# Patient Record
Sex: FEMALE | Race: WHITE | URBAN - METROPOLITAN AREA
[De-identification: names, ages, dates, MRNs, and addresses within clinical notes are randomized per-mention and may not be internally consistent; named-entity substitution may affect disease eponyms.]

---

## 2017-06-23 ENCOUNTER — OFFICE VISIT (OUTPATIENT)
Dept: INTERNAL MEDICINE CLINIC | Facility: CLINIC | Age: 32
End: 2017-06-23

## 2017-06-23 VITALS
BODY MASS INDEX: 22 KG/M2 | TEMPERATURE: 98 F | HEART RATE: 66 BPM | SYSTOLIC BLOOD PRESSURE: 102 MMHG | OXYGEN SATURATION: 98 % | WEIGHT: 123 LBS | DIASTOLIC BLOOD PRESSURE: 65 MMHG

## 2017-06-23 DIAGNOSIS — R21 RASH: Primary | ICD-10-CM

## 2017-06-23 DIAGNOSIS — T78.40XA ALLERGIC REACTION, INITIAL ENCOUNTER: ICD-10-CM

## 2017-06-23 PROCEDURE — 99212 OFFICE O/P EST SF 10 MIN: CPT | Performed by: INTERNAL MEDICINE

## 2017-06-23 PROCEDURE — 99213 OFFICE O/P EST LOW 20 MIN: CPT | Performed by: INTERNAL MEDICINE

## 2017-06-23 RX ORDER — AZITHROMYCIN 500 MG/1
TABLET, FILM COATED ORAL
Refills: 1 | COMMUNITY
Start: 2017-06-12

## 2017-06-23 NOTE — PATIENT INSTRUCTIONS
ASSESSMENT/PLAN:   Rash  (primary encounter diagnosis)- will treat with steroid cream, use as prescribed  Let us know if not better   Allergic reaction, initial encounter- as above, if not improved might need po steroids

## 2017-06-23 NOTE — PROGRESS NOTES
HPI:    Patient ID: Kandy Lan is a 27year old female. HPI  Pt comes in today with complaint of a rash to front front of chest and back. Pt had changed her fabric softener and the symptoms started.  In the past had used a hypoallergic one and was work better   Allergic reaction, initial encounter- as above, if not improved might need po steroids     No orders of the defined types were placed in this encounter.        Meds This Visit:  Signed Prescriptions Disp Refills    triamcinolone acetonide 0.1 % Ext

## 2022-07-22 NOTE — TELEPHONE ENCOUNTER
Thank you Nancy. Please advise Ms. Alegre to come in to the laboratory for stool tests ordered today to further evaluate. She should get the labs ordered by Dr. Madhav Bell ordered at the same time. Apparently routine labs and CT scan were normal during outside ER evaluation. Much more important, she should be checked for gallstones. If she had a CT scan before, that does not  gallstones. Ultrasound of liver and gallbladder ordered today to check her for gallstones. Please advise her to call and schedule the ultrasound. Dr. Madhav Bell should be able to assess when she returns.     - cb

## 2022-07-28 NOTE — TELEPHONE ENCOUNTER
Reviewed labs, negative for infection or Hpylori   Stool calprotectin normal  NO celiac and normal thyroid  Still awaiting US abd

## 2022-08-02 NOTE — TELEPHONE ENCOUNTER
Conveyed below with Noemi Pittman. Would like to proceed with egd and colonoscopy. Only egd orders provided at time of visit. Routed symptom update and request for colonoscopy orders to Dr. Greta Durbin for further review. See telephone encounter dated 7/18/22.

## 2022-09-08 NOTE — TELEPHONE ENCOUNTER
pt is returning the sched call to resched 10/25/2022 CLN/EGD proc at [facility]. I tried to reach sched but no answer.

## 2022-10-17 NOTE — TELEPHONE ENCOUNTER
Received e-mail from [facility] asking to cancel in [employer]. I sent cancellation through [employer] and also removed from appt desk in [facility] [address].

## 2022-11-08 NOTE — TELEPHONE ENCOUNTER
Scheduled for:  Colonoscopy [phone] & EGD [id number]  Provider Name:  Dr. Kelley Ricks  Date:  01/17/2023  Location:  [facility]  Sedation:  MAC  Time:  1:30 pm, (pt is aware that [facility] will call the day before to confirm arrival time)  Prep:  Trilyte  Meds/Allergies Reconciled?:  Yes  Diagnosis with codes:  Bloating R14.0; Diarrhea R19.7; Abdominal pain R10.9  Was patient informed to call insurance with codes (Y/N):  Yes  Referral sent?:  Yes  [facility] or [address] notified?:  Electronic case request was sent to [facility] via [employer]. Medication Orders:  N/A  Misc Orders:  Patient was informed that they will need a COVID 19 test prior to their procedure. Patient verbally understood & will await a phone call from [facility] to schedule. Further instructions given by staff: Informed patient I will send updated prep instructions via [employer] and she verbalized understanding.

## 2023-01-18 NOTE — TELEPHONE ENCOUNTER
I believe she is breastfeeding too so please have her confirm what to do with medications while breastfeeding with pharmacy and if they are safe

## 2023-01-18 NOTE — TELEPHONE ENCOUNTER
Called patient,  verified. Patient confirmed that she is breastfeeding. Below message relayed, instructed to call if pharmacy does not recommend medications. Patient verbalized understanding.

## 2023-01-19 NOTE — TELEPHONE ENCOUNTER
----- Message from Vladimir Ramires MD sent at 1/18/2023  3:05 PM CST -----  Recall colonoscopy age appropriate screening 38  Call patient and review Hpylori treatment and stool test in 8 weeks  Celiac blood work ordered

## 2023-01-20 NOTE — TELEPHONE ENCOUNTER
[address] [phone] to find out if it is safe for patient to take medications sent for H. Pylori treatment while patient is breastfeeding, spoke with Kenan Carter and transferred me to Carl Lopez, pharmacist. Carl Lopez said he will review and will call me back. CSS: If Carl Lopez, pharmacist from [place] calls, please transfer the call to me. Thank you. Health maintenance updated. Last colonoscopy done 1/17/23 by Dr. Roseanna Guerrero    7 year recall placed into Pt Outreach (age 38)    Next due on 11/9/2030  per Dr. Roseanna Guerrero. (age 38)    EGD placed in specialty comments.

## 2023-01-20 NOTE — TELEPHONE ENCOUNTER
Dr. Greta Durbin,    Please advise below. I don't see the orders for celiac blood work. Thank you. Called Bin [phone],TICO with Jamison Carroll, pharmacist. He said that \"Metronidazole, Tetracycline, Omeprazole and Bismuth are not contraindicated with breastfeeding however precaution is required\". [facility] pharmacy, spoke with pharmacist Jennifer to inquire about above medication precaution. She stated that Tetracycline and Metronidazole can pass into breast milk so patient should pump and dump the milk while taking these medications and 24 hours after the last treatment. Called patient, SARWAT verified. Patient confirmed that she is breastfeeding, stated that she has not picked up the medications yet. Informed above precautions and told patient that MD will be notified for possible alternate therapy and to not  medications until she hears from us. Patient verbalized understanding.

## 2023-02-06 NOTE — TELEPHONE ENCOUNTER
Dr. Dylan Severino,    Please advise on triple therapy. Amoxicillin and Clindamycin are OK. Thank you.     Called pharmacy to inquire regarding breastfeeding precautions with the antibiotics, spoke with Lyn Sherman, pharmacist.    Ronnell Estevez with breastfeeding    Clindamycin- ok with breastfeeding

## 2023-02-06 NOTE — TELEPHONE ENCOUNTER
Can wait to treat if feeling well until after done breastfeeding   Can also offer triple therapy with clindamycin, amoxicillin, PPI   Amoxicillin does not pass and clindamycin in small amounts so would check with pharmacy  Celiac labs ordered

## 2023-02-06 NOTE — TELEPHONE ENCOUNTER
Note  ----- Message from Stella Panchal MD sent at 1/18/2023  3:05 PM CST -----  Recall colonoscopy age appropriate screening 38  Call patient and review Hpylori treatment and stool test in 8 weeks  Celiac blood work ordered  [facility]

## 2023-04-14 NOTE — TELEPHONE ENCOUNTER
Patient outreach message received:    Patient was made aware and was informed that we will call her for reminder for repeat h. Pylori test post treatment.

## 2023-04-20 NOTE — TELEPHONE ENCOUNTER
Called pt-left detailed message to go to [facility] lab to  stool kit for repeat h. Pylori stool test.    Call back number provided for pt to call back with further questions/concerns.

## 2023-05-08 NOTE — TELEPHONE ENCOUNTER
Called patient, name/ verified. Informed pt of the repeat stool test for h. Pylori to be done and instructed to go to any [facility] lab. Pt stated she is not taking any PPI's. Patient verbalized understanding, \" Purvi  will go\", no further concerns, issues at this time.     No further action required        TE closed

## 2023-07-13 NOTE — TELEPHONE ENCOUNTER
From: Nadira Mccray  To: Connie Jacobs MD  Sent: 7/10/2023 2:06 PM CDT  Subject: Question regarding H.  PYLORI STOOL ANTIGEN    I ended up at the hospital on Sunday with unbearable stomach pain amd it turns out that i still have h pylori

## 2023-07-13 NOTE — TELEPHONE ENCOUNTER
Poonam Monaco  Clinical Staff (supporting Leonard Hi MD)1 hour ago (2:04 PM)     Yes I am being retreated, and I was tested a week later so how was I infected again

## 2023-07-20 NOTE — TELEPHONE ENCOUNTER
H. Pylori testing recall placed into Pt Outreach to be completed 8 weeks after completing 14 day antibiotic regimen due around 9/4/23.

## 2023-08-02 NOTE — TELEPHONE ENCOUNTER
RN called and spoke to pt regarding her [employer] message. Pt states she is not having any pain currently, c/o one episode of abdominal pain after finishing her most recent round of treatment for h pylori. Pain is umbilical and can last all day, not initiated by anything and not relieved by anything-goes away on it's own. Pt denies N/V/C/D/fevers/bleeding. Pain was similar to pain she had initially seen Dr. Caridad Dumas for in 2022. Pt instructed to notify the office for any new symptoms otherwise plan is to proceed with h pylori eradication testing when due (early Sept). Pt agreeable and verbalized understanding.        Curt GALEANO [facility] Clinical Staff (supporting Maia Arauz MD)         7/28/23 10:04 AM  Good morning, i finished my antibiotics last weeka dn i am getting stomach pains again is this normal?

## 2023-09-08 NOTE — TELEPHONE ENCOUNTER
RN called and spoke to pt, informed her she is due for h pylori eradication testing. H pylori breath test is already ordered. Reiterated that testing should be done at least 6 weeks after 14 day course of treatment is completed and that pt should not take any abx, ppi, or pepto bismol for two weeks before breath test. Pt confirmed that she is not taking those medications and verbalized understanding.

## 2023-09-08 NOTE — TELEPHONE ENCOUNTER
Patient outreach message received:    H. Pylori testing recall placed into Pt Outreach to be completed 8 weeks after completing 14 day antibiotic regimen due around 9/4/23.

## 2024-05-22 NOTE — TELEPHONE ENCOUNTER
Patient scheduled an appointment via [facility] for the following concern:    Lower abdominal pains, hard to loose weight and severe headaches

## 2024-05-23 NOTE — TELEPHONE ENCOUNTER
Attempted to call pt but mailbox is not set up yet.   [employer] message sent to patient to call back.       Future Appointments   Date Time Provider Department Center   7/5/2024  9:40 AM Saloni Montero APRN [facility]

## 2024-05-24 NOTE — TELEPHONE ENCOUNTER
Patient has read [employer] message.     From  Chelita Cheney RN To  Britney Alegre Sent and Delivered  5/23/2024 10:25 AM   Last Read in [employer]  5/23/2024  8:25 PM by Britney Alegre     Attempted to call patient again, no answer, voicemail box is still not set up.

## 2024-07-05 NOTE — PROGRESS NOTES
HPI    Patient presents for concerns of abdominal bloating as well as abdominal pain.  With a history of H. pylori.  Would like to recheck to make sure this is not the cause of her symptoms.  Also with concerns of a recurrent hemorrhoid.  Has been using over-the-counter creams without relief of symptoms.  With scant amount of blood when wiping.  Denies constipation.    Also with concerns of recurrent neck pain.  Has been going to a chiropractor which she reports has helped at times but symptoms are still recurring.  Denies radiation of pain into bilateral arms.  Takes Tylenol as needed with resolution of pain.  Pain started 2 years ago after falling off of a stepstool.    Patient also complains of very heavy menstrual cycles.  Did not have this problem prior to having her son.    Also with concerns of issues with losing weight.  Has been on a strict diet and exercise regimen for the past 2 months.  States that she has only been able to lose 8 pounds while family members so she has been on the same diet with have been able to lose 20.  Would like to check hormone levels as well as thyroid function and insulin level.    Review of Systems   Constitutional:  Positive for unexpected weight change.   Gastrointestinal:  Positive for abdominal distention and abdominal pain.   Genitourinary:  Positive for menstrual problem.   Musculoskeletal:  Positive for neck pain.        Vitals:    07/05/24 0946   BP: 101/69   Pulse: 74   Weight: 180 lb (81.6 kg)   Height: 5' 2\" (1.575 m)     Body mass index is 32.92 kg/m².    Health Maintenance   Topic Date Due    Annual Depression Screening  01/01/2024    Annual Physical  06/28/2024    Influenza Vaccine (1) 10/01/2024    Pap Smear  06/28/2026    DTaP,Tdap,and Td Vaccines (2 - Td or Tdap) 06/09/2028    Colorectal Cancer Screening  11/09/2030    Pneumococcal Vaccine: Birth to 64yrs  Aged Out       No LMP recorded.    Past Medical History:    Hemorrhoids       .  Past Surgical History:    Procedure Laterality Date    Hernia repair  2014    umbilical hernia repair    Hernia surgery         Family History   Problem Relation Age of Onset    Heart Attack Father     Heart Attack Other        Social History     Socioeconomic History    Marital status:      Spouse name: Not on file    Number of children: 0    Years of education: Not on file    Highest education level: Not on file   Occupational History    Not on file   Tobacco Use    Smoking status: Former    Smokeless tobacco: Never   Vaping Use    Vaping status: Never Used   Substance and Sexual Activity    Alcohol use: Not Currently     Comment: occasionally    Drug use: Never    Sexual activity: Not on file   Other Topics Concern     Service No    Blood Transfusions No    Caffeine Concern Yes     Comment: coffee    Occupational Exposure No    Hobby Hazards No    Sleep Concern No    Stress Concern Yes    Weight Concern No    Special Diet No    Back Care No    Exercise Yes    Bike Helmet No    Seat Belt Yes    Self-Exams Yes   Social History Narrative    ** Merged History Encounter **          Social Determinants of Health     Financial Resource Strain: Not on file   Food Insecurity: No Food Insecurity (3/29/2021)    [facility]    Hunger Vital Sign     Worried About Running Out of Food in the Last Year: Never true     Ran Out of Food in the Last Year: Never true   Transportation Needs: Not on file   Physical Activity: Not on file   Stress: Not on file   Social Connections: Not on file   Housing Stability: Not on file       Current Outpatient Medications   Medication Sig Dispense Refill    hydrocortisone 2.5 % External Cream Place 1 Application rectally 2 (two) times daily as needed for Hemorrhoids. 28 g 2    Multiple Vitamin (ONE-DAILY MULTI VITAMINS) Oral Tab Take 1 tablet by mouth daily.      Omega-3 1000 MG Oral Cap Take 1 capsule by mouth daily.      Turmeric 500 MG Oral  Cap Take 1 capsule by mouth daily.         Allergies:  No Known Allergies    Physical Exam  Vitals and nursing note reviewed.   Constitutional:       General: She is not in acute distress.     Appearance: Normal appearance.   HENT:      Head: Normocephalic and atraumatic.   Cardiovascular:      Rate and Rhythm: Normal rate and regular rhythm.      Heart sounds: Normal heart sounds. No murmur heard.  Pulmonary:      Effort: Pulmonary effort is normal. No respiratory distress.      Breath sounds: Normal breath sounds. No stridor. No wheezing, rhonchi or rales.   Chest:      Chest wall: No tenderness.   Abdominal:      General: Abdomen is flat. Bowel sounds are normal. There is no distension.      Palpations: Abdomen is soft. There is no mass.      Tenderness: There is no abdominal tenderness. There is no guarding or rebound.      Hernia: No hernia is present.   Skin:     General: Skin is warm and dry.   Neurological:      Mental Status: She is alert and oriented to person, place, and time.          Assessment and Plan:  Problem List Items Addressed This Visit    None  Visit Diagnoses       Abdominal bloating    -  Primary    Relevant Medications    hydrocortisone 2.5 % External Cream    Other Relevant Orders    Helicobacter Pylori Breath Test, Adult    Abdominal pain, unspecified abdominal location        Relevant Medications    hydrocortisone 2.5 % External Cream    Other Relevant Orders    Helicobacter Pylori Breath Test, Adult    Neck pain        Relevant Orders    PHYSICAL THERAPY - INTERNAL    Menorrhagia with regular cycle        Relevant Orders    OBG Referral - In Network    XR CERVICAL SPINE (4VIEWS) (CPT=72050)    Cervical spine pain        Class 1 obesity with body mass index (BMI) of 32.0 to 32.9 in adult, unspecified obesity type, unspecified whether serious comorbidity present        Hemorrhoids, unspecified hemorrhoid type        Relevant Medications    hydrocortisone 2.5 % External Cream            Cervical spine x-ray, physical therapy assessment and treatment ordered for patient.  Referral to GYN for assessment and treatment.  Anusol cream twice daily as needed.  H. pylori breath test.  Lab workup ordered for patient, as well.     Discussed plan of care with patient and patient is in agreement.  All questions answered. Patient to call with questions or concerns.    Encouraged to sign up for My Chart if not already registered.

## 2024-08-28 NOTE — PROGRESS NOTES
[facility]  Obstetrics and Gynecology  Gyne Problem Visit      Britney Alegre is a 38 year old female  to discuss heavy periods. States since her last pregnancy 2 years ago periods have gotten heavier. Cycles are regular with menses lasting about 5 days. First 2 days are heaviest, states she will soak through overnight pads, will change pads/tampons every 2 hours during the day. Does not notice large clots. Patient feeling more fatigued and headaches with occasional dizziness. Has not had a recent CBC or iron study completed. Denies history of anemia. Patient states she is done with having children. She is not on any form of contraception. No abnormal vaginal discharge, odor, irritation, or itching. No intermenstrual spotting.    Patient's last menstrual period was 2024.     Pap: 2023  Contraception:None    OBSTETRICS HISTORY:  OB History    Para Term  AB Living   2 1 1 0 0 1   SAB IAB Ectopic Multiple Live Births   0 0 0 0 1       GYNE HISTORY:      Period Cycle (Days): 28-30 (2024  9:51 AM)  Period Duration (Days): 5 (2024  9:51 AM)  Period Flow: heavy (2024  9:51 AM)  Use of Birth Control (if yes, specify type): None (2024  9:51 AM)        Latest Ref Rng & Units 2023     1:13 PM   RECENT PAP RESULTS   Thinprep Pap Negative for intraepithelial lesion or malignancy Negative for intraepithelial lesion or malignancy    HPV Negative Negative          History   Sexual Activity    Sexual activity: Not on file       MEDICAL HISTORY:  Past Medical History:   Diagnosis Date    Hemorrhoids      Past Surgical History:   Procedure Laterality Date    Hernia repair  2014    umbilical hernia repair    Hernia surgery         SOCIAL HISTORY:  Social History     Socioeconomic History    Marital status:      Spouse name: Not on file    Number of children: 0    Years of education: Not on file    Highest education level: Not on file   Occupational History    Not on file   Tobacco  Use    Smoking status: Former    Smokeless tobacco: Never   Vaping Use    Vaping status: Never Used   Substance and Sexual Activity    Alcohol use: Not Currently     Comment: occasionally    Drug use: Never    Sexual activity: Not on file   Other Topics Concern     Service No    Blood Transfusions No    Caffeine Concern Yes     Comment: coffee    Occupational Exposure No    Hobby Hazards No    Sleep Concern No    Stress Concern Yes    Weight Concern No    Special Diet No    Back Care No    Exercise Yes    Bike Helmet No    Seat Belt Yes    Self-Exams Yes   Social History Narrative    ** Merged History Encounter **          Social Determinants of Health     Financial Resource Strain: Not on file   Food Insecurity: No Food Insecurity (3/29/2021)    [facility]    Hunger Vital Sign     Worried About Running Out of Food in the Last Year: Never true     Ran Out of Food in the Last Year: Never true   Transportation Needs: Not on file   Physical Activity: Not on file   Stress: Not on file   Social Connections: Not on file   Housing Stability: Not on file       MEDICATIONS:    Current Outpatient Medications:     acidophilus-pectin Oral Cap, Take 1 capsule by mouth daily., Disp: , Rfl:     Multiple Vitamin (ONE-DAILY MULTI VITAMINS) Oral Tab, Take 1 tablet by mouth daily., Disp: , Rfl:     Turmeric 500 MG Oral Cap, Take 1 capsule by mouth daily., Disp: , Rfl:     hydrocortisone 2.5 % External Cream, Place 1 Application rectally 2 (two) times daily as needed for Hemorrhoids. (Patient not taking: Reported on 8/28/2024), Disp: 28 g, Rfl: 2    ALLERGIES:  No Known Allergies      REVIEW OF SYSTEMS:  Review of Systems   Constitutional:  Negative for chills, fever and unexpected weight change.   Respiratory: Negative.     Cardiovascular: Negative.    Gastrointestinal:  Negative for abdominal pain, constipation, diarrhea and nausea.   Genitourinary:  Positive for  menstrual problem (heavy bleeding). Negative for dyspareunia, dysuria, genital sores, hematuria, pelvic pain, vaginal bleeding, vaginal discharge and vaginal pain.   Musculoskeletal: Negative.    Skin: Negative.    Neurological: Negative.    Hematological: Negative.    Psychiatric/Behavioral: Negative.         PHYSICAL EXAM:  /75 (BP Location: Right arm, Patient Position: Sitting, Cuff Size: adult)   Pulse 69   Wt 182 lb (82.6 kg)   LMP 08/12/2024   BMI 33.29 kg/m²     GENERAL: well developed, well nourished, in no apparent distress  ABDOMEN: Soft, non distended; non tender, no masses  GYNE/: External Genitalia: Normal appearing, no lesions. Urethral meatus appear wnl, no abnormal discharge or lesions noted.          Bladder: well supported, urethra wnl, no lesions or fissures                     Vagina: normal pink mucosa, no lesions, normal clear discharge.                      Uterus:  mobile, non tender, normal size                     Cervix: Normal                      Adnexa: non tender, no masses, normal size     ASSESSMENT:       ICD-10-CM    1. Menorrhagia with regular cycle  N92.0 CBC With Differential With Platelet     Iron and TIBC     Ferritin     US PELVIS (TRANSABDOMINAL AND TRANSVAGINAL) (CPT=76856/74488)      2. Fatigue, unspecified type  R53.83 CBC With Differential With Platelet     Iron and TIBC     Ferritin      3. Dizziness  R42 CBC With Differential With Platelet     Iron and TIBC     Ferritin          Plan:  Pt counseled on possible etiologies of heavy periods and/or irregular bleeding including uterine fibroids, DUB/anovulatory bleeding and other benign and less common malignant etiologies.  Discussed possible work-up options including exam, pelvic US and endometrial biopsy.  Discussed treatment options including medical and surgical.  Medical options include OCPs, Nuvaring, patch for cycle control along with depo provera for menstrual suppression.  Discussed Mirena IUD and  menstrual benefits.  Discussed surgical options that include endometrial ablation and hysterectomy.  Pt counseled on risks/benefits of each of the appropriate options.  We will first order labs and ultrasound, patient is considering IUD vs. Surgical management. Will await results.       SAMANTHA FERGUSON PA-C  9:42 AM  8/28/2024

## 2024-10-22 NOTE — TELEPHONE ENCOUNTER
" Writer called  [phone] and spoke with  Sary, Behavioral Health . Serum Potassium findings of 3.5 was reported to Sary. \" Freddy Tolbert paper work is still under review. We will call to let you know if he is accepted\". Per Sary. Dr. Armas updated.    " Repeat H. Pylori test post treatment placed in pt outreach. Called patient,  verified. Patient was made aware and was informed that we will call her for reminder for repeat h. Pylori test post treatment. Patient verbalized understanding.

## 2024-11-02 NOTE — DISCHARGE INSTRUCTIONS
Salt water gargles and tea with honey may help soothe throat.  Tylenol or ibuprofen as needed for pain or fever.  I also do recommend taking Mucinex or Sudafed, sinus rinse daily, Flonase daily.  Close follow-up with your primary care provider as recommended.  Any worsening symptoms please go to the ER.

## 2024-11-02 NOTE — ED PROVIDER NOTES
Patient Seen in: [facility]      History     Chief Complaint   Patient presents with    Sore Throat    Sinus Problem     Stated Complaint: Cold- sinus pressure  Subjective:   HPI    This is a well-appearing 38-year-old with no significant past medical history who presents with a chief complaint of sore throat, frontal headache, sinus pressure over the last 3 days.  States she was recently exposed to strep.  Denies any posterior neck pain or neck stiffness.  No rash.  Continue have a good appetite.  No nausea, vomiting or diarrhea.  No cough.    Objective:   Past Medical History:    Hemorrhoids            Past Surgical History:   Procedure Laterality Date    Hernia repair  2014    umbilical hernia repair    Hernia surgery                Social History     Socioeconomic History    Marital status:     Number of children: 0   Tobacco Use    Smoking status: Former    Smokeless tobacco: Never   Vaping Use    Vaping status: Never Used   Substance and Sexual Activity    Alcohol use: Not Currently     Comment: occasionally    Drug use: Never   Other Topics Concern     Service No    Blood Transfusions No    Caffeine Concern Yes     Comment: coffee    Occupational Exposure No    Hobby Hazards No    Sleep Concern No    Stress Concern Yes    Weight Concern No    Special Diet No    Back Care No    Exercise Yes    Bike Helmet No    Seat Belt Yes    Self-Exams Yes   Social History Narrative    ** Merged History Encounter **          Social Drivers of Health     Food Insecurity: No Food Insecurity (3/29/2021)    [facility]    Hunger Vital Sign     Worried About Running Out of Food in the Last Year: Never true     Ran Out of Food in the Last Year: Never true            Review of Systems   All other systems reviewed and are negative.      Positive for stated complaint: Sore Throat and Sinus Problem    Other systems are as noted in  HPI.  Constitutional and vital signs reviewed.      All other systems reviewed and negative except as noted above.    Physical Exam     ED Triage Vitals [11/02/24 0942]   /75   Pulse 76   Resp 18   Temp 98.2 °F (36.8 °C)   Temp src Temporal   SpO2 98 %   O2 Device None (Room air)     Current:/75   Pulse 76   Temp 98.2 °F (36.8 °C) (Temporal)   Resp 18   LMP 08/12/2024   SpO2 98%     Physical Exam  Vitals and nursing note reviewed.   Constitutional:       General: She is awake. She is not in acute distress.     Appearance: Normal appearance. She is not ill-appearing, toxic-appearing or diaphoretic.   HENT:      Head: Normocephalic and atraumatic.      Right Ear: Tympanic membrane, ear canal and external ear normal. No tenderness. No middle ear effusion. Tympanic membrane is not erythematous.      Left Ear: Tympanic membrane, ear canal and external ear normal. No tenderness.  No middle ear effusion. Tympanic membrane is not erythematous.      Nose: Mucosal edema and rhinorrhea present. Rhinorrhea is clear.      Mouth/Throat:      Lips: Pink.      Mouth: Mucous membranes are moist. No oral lesions.      Pharynx: Oropharynx is clear. Uvula midline. Posterior oropharyngeal erythema present. No pharyngeal swelling, oropharyngeal exudate, uvula swelling or postnasal drip.   Eyes:      General: Lids are normal.      Extraocular Movements: Extraocular movements intact.      Conjunctiva/sclera: Conjunctivae normal.      Pupils: Pupils are equal, round, and reactive to light.   Cardiovascular:      Rate and Rhythm: Normal rate and regular rhythm.      Pulses: Normal pulses.      Heart sounds: Normal heart sounds.   Pulmonary:      Effort: Pulmonary effort is normal.      Breath sounds: Normal breath sounds and air entry. No stridor, decreased air movement or transmitted upper airway sounds.   Musculoskeletal:      Cervical back: Full passive range of motion without pain and normal range of motion.   Skin:      General: Skin is warm and dry.      Capillary Refill: Capillary refill takes less than 2 seconds.   Neurological:      General: No focal deficit present.      Mental Status: She is alert and oriented to person, place, and time.   Psychiatric:         Mood and Affect: Mood normal.         Behavior: Behavior normal. Behavior is cooperative.         Thought Content: Thought content normal.         Judgment: Judgment normal.       ED Course   Rapid strep and reevaluate  Strep negative.  Patient is requesting a culture.  No results found.  Labs Reviewed   POCT RAPID STREP - Normal   GRP A STREP CULT, THROAT       MDM     Medical Decision Making  Differential diagnoses reflecting the complexity of care include but are not limited to viral versus bacterial pharyngitis, viral sinusitis.    Comorbidities that add complexity to management include: N/A  History obtained by an independent source was from: N/A  Discussions of management was done with: N/A  My independent interpretations of studies include: Strep negative  Shared decision making was done by: Patient and myself  Patient is well appearing, non-toxic and in no acute distress.  Vital signs are stable.  Patient with clear speech, no drooling, easy respirations.  Discussed with the patient that the strep here was negative.  She is requesting a culture.  I did discuss with her this can be an expensive test but she would still like it completed.  I did discuss with her supportive care.  She did request antibiotics for a sinus infection.  I discussed with her her symptoms started 3 to 4 days ago, this is most likely viral in etiology.  I did encourage her to use sinus rinse daily, Flonase may take Sudafed and Mucinex as well.  Close follow-up with her PCP was recommended.  Patient verbalized plan of care and stated understanding.    Problems Addressed:  Sore throat: acute illness or injury        Disposition and Plan     Clinical Impression:  1. Acute viral pharyngitis     2. Sore throat         Disposition:  Discharge  11/2/2024 10:00 am    Follow-up:  Eula Mcknight MD  [address] [place] [id number]  [phone]                Medications Prescribed:  Current Discharge Medication List             Note to patient: The 21st Century cares act makes medical notes like these available to patients in the interest of transparency.  However, be advised this medical document and is intended as peer to peer communication.  It is read the medical language and may contain abbreviations or verbiage that are unfamiliar.  It may appear blunt or direct.  Medical documents are intended to carry relevant information, fax is evident and the clinical opinion of the practitioner.    This note was prepared using Dragon Medical voice recognition dictation software.  As a result, errors may occur.  When identified, these errors have been corrected.  While every attempt is made to correct errors during dictation, discrepancies may still exist.    Hawa Melara, APRN  11/2/2024  9:49 AM

## 2024-11-02 NOTE — ED INITIAL ASSESSMENT (HPI)
Close contact w/ people w/ positive strep, developed sore throat 3 days later this past Thursday. Also reports headache and sinus pressure.

## 2024-12-06 NOTE — PROGRESS NOTES
HPI    Patient presents for concerns of sinus congestion that has been present on and off for the past 4 months.  Has been using vicks nasal spray as needed with relief of symptoms.  With clear mucus.  Denies fever.  Did have a headache last week that she needed to take medication for.  No other symptoms.      Review of Systems   HENT:  Positive for congestion.    Neurological:  Positive for headaches.   All other systems reviewed and are negative.       Vitals:    12/06/24 0927   BP: 94/62   Pulse: 68   Weight: 180 lb 3.2 oz (81.7 kg)   Height: 5' 2\" (1.575 m)     Body mass index is 32.96 kg/m².    Health Maintenance   Topic Date Due    Mammogram  Never done    Annual Depression Screening  01/01/2024    Annual Physical  06/28/2024    Influenza Vaccine (1) Never done    Pap Smear  06/28/2026    DTaP,Tdap,and Td Vaccines (2 - Td or Tdap) 06/09/2028    Colorectal Cancer Screening  11/09/2030    Pneumococcal Vaccine: Birth to 64yrs  Aged Out       Patient's last menstrual period was 11/18/2024 (approximate).    Past Medical History:    Hemorrhoids       .  Past Surgical History:   Procedure Laterality Date    Hernia repair  2014    umbilical hernia repair    Hernia surgery         Family History   Problem Relation Age of Onset    Heart Attack Father     Heart Attack Other        Social History     Socioeconomic History    Marital status:      Spouse name: Not on file    Number of children: 0    Years of education: Not on file    Highest education level: Not on file   Occupational History    Not on file   Tobacco Use    Smoking status: Former    Smokeless tobacco: Never   Vaping Use    Vaping status: Never Used   Substance and Sexual Activity    Alcohol use: Not Currently     Comment: occasionally    Drug use: Never    Sexual activity: Not on file   Other Topics Concern     Service No    Blood Transfusions No    Caffeine Concern Yes     Comment: coffee    Occupational Exposure No    Hobby Hazards No     Sleep Concern No    Stress Concern Yes    Weight Concern No    Special Diet No    Back Care No    Exercise Yes    Bike Helmet No    Seat Belt Yes    Self-Exams Yes   Social History Narrative    ** Merged History Encounter **          Social Drivers of Health     Financial Resource Strain: Not on file   Food Insecurity: No Food Insecurity (3/29/2021)    [facility]    Hunger Vital Sign     Worried About Running Out of Food in the Last Year: Never true     Ran Out of Food in the Last Year: Never true   Transportation Needs: Not on file   Physical Activity: Not on file   Stress: Not on file   Social Connections: Not on file   Housing Stability: Not on file       Current Outpatient Medications   Medication Sig Dispense Refill    montelukast 10 MG Oral Tab Take 1 tablet (10 mg total) by mouth nightly. 90 tablet 1    fluticasone propionate 50 MCG/ACT Nasal Suspension 2 sprays by Each Nare route daily. 18.2 mL 3    Multiple Vitamin (ONE-DAILY MULTI VITAMINS) Oral Tab Take 1 tablet by mouth daily.      Turmeric 500 MG Oral Cap Take 1 capsule by mouth daily.         Allergies:  Allergies[1]    Physical Exam  Vitals and nursing note reviewed.   Constitutional:       General: She is not in acute distress.     Appearance: Normal appearance. She is not ill-appearing.   HENT:      Head: Normocephalic and atraumatic.      Right Ear: Tympanic membrane, ear canal and external ear normal. There is no impacted cerumen.      Left Ear: Tympanic membrane, ear canal and external ear normal. There is no impacted cerumen.      Nose: Congestion present.      Right Turbinates: Pale.      Left Turbinates: Pale.   Cardiovascular:      Rate and Rhythm: Normal rate and regular rhythm.      Heart sounds: Normal heart sounds. No murmur heard.  Pulmonary:      Effort: Pulmonary effort is normal. No respiratory distress.      Breath sounds: Normal breath sounds. No stridor. No wheezing,  rhonchi or rales.   Chest:      Chest wall: No tenderness.   Neurological:      Mental Status: She is alert and oriented to person, place, and time.          Assessment and Plan:  Problem List Items Addressed This Visit    None  Visit Diagnoses       Allergic rhinitis, unspecified seasonality, unspecified trigger    -  Primary    Relevant Medications    montelukast 10 MG Oral Tab    fluticasone propionate 50 MCG/ACT Nasal Suspension           To start montelukast and Flonase once daily.  Add over-the-counter Zyrtec or Claritin once daily, as well.  Supportive care discussed.  Follow-up as needed.     Discussed plan of care with patient and patient is in agreement.  All questions answered. Patient to call with questions or concerns.    Encouraged to sign up for My Chart if not already registered.        [1] No Known Allergies

## 2024-12-18 NOTE — TELEPHONE ENCOUNTER
Saloni, please see patient message below. The medication prescribed is not helping her. Thank you.

## (undated) NOTE — MR AVS SNAPSHOT
[address] [facility] [address]  [phone]               Thank you for choosing us for your health care visit with Joseph Gonzalez MD.  We are glad to serve you and happy to provide you with this summary of your visit. Nereyda Curtis [place]sch [id number]     Phone:  [phone]    - triamcinolone acetonide 0.1 % Crea            HSTYLE     Sign up for HSTYLE, your secure online medical record.   HSTYLE will allow you to access patient instructions from your rec